# Patient Record
Sex: FEMALE | URBAN - METROPOLITAN AREA
[De-identification: names, ages, dates, MRNs, and addresses within clinical notes are randomized per-mention and may not be internally consistent; named-entity substitution may affect disease eponyms.]

---
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
## 2019-11-05 ENCOUNTER — HEALTH MAINTENANCE LETTER (OUTPATIENT)
Age: 19
End: 2019-11-05

## 2020-11-22 ENCOUNTER — HEALTH MAINTENANCE LETTER (OUTPATIENT)
Age: 20
End: 2020-11-22

## 2021-08-26 NOTE — PROGRESS NOTES
Psychiatry- [address] [address]   CLINICAL PROGRESS NOTE   NAME: Ruby Castillo  : 2000 JEFF: 2018  SESSION TYPE: Individual Psychotherapy ([address])  CLINICIAN: Vicki Alcaraz, Ph.D., L.P.  TIME/LENGTH OF SESSION: 1:00 p.m. - 1:35 p.m. (35 minutes)   DIAGNOSIS: Anxiety Disorder, not otherwise specified  PARTICIPANTS: Ruby Castillo, Rosaura Castillo (mother), Vicki Alcaraz, Ph.D., L.P.  Subjective: Rosaura had called several weeks ago requesting an appointment while Ruby was home for winter break from college. During the appointment, Ruby explained that she had run out of her medication (both SSRI and stimulant) for several weeks approximately two months ago. At that point, Ruby experienced depressive symptoms including irritability. Fortunately, she was able to access care in [place] and her symptoms improved dramatically once she restarted her medication. She now has a psychiatrist near [place], but she has not found a new therapist. She noted that she has made friends at school. She did relatively well academically and is looking forward to the upcoming semester. She continues dating her boyfriend long-distance. She noted that she has been less concerned about her mom being upset with her since she cannot see her mom's face when they talk. Rosaura reported that overall she has been pleased with Ruby's transition to college. Initially she had been upset when Ruby ran out of medicine because she had not followed through with establishing care with new providers as they had discussed. She expressed a desire to communicate slightly more frequently but noted that she yin with [place] moving away by pretending that Ruby may not be engaged in irresponsible behaviors.    Treatment: Ruby and her m om arrived on time for today's appointment. Today was Ruby's first appointment with this writer in 8 months  following a call from her mom. Ruby and I met individually first and discussed the details in the subjective section. Her mom joined for the second half of the visit.   Assessment: Ruby participated well in session. She was casually dressed and appropriately groomed. Affect was full range. No concerns with noted with psychomotor agitation or retardation. She toward the front edge of her seat for most of the visit. She interacted with her mom appropriately. It seems Ruby may be setting herself up for a challenging semester by taking a 4000 level course but she is excited to obtain a double major and double minor (she reports this is not unusual in [employer] universities). She and her mom had a slight disagreement regarding the class but conversed appropriately.    Plan: Ruby does not currently have a return appointment scheduled.  Ruby and her mom are aware she is welcome to contact this writer for future visits when she is home from college.     TREATMENT PLAN DUE FOR REVIEW BY NEXT VISIT     Treatment Area Prep: acetone

## 2021-09-19 ENCOUNTER — HEALTH MAINTENANCE LETTER (OUTPATIENT)
Age: 21
End: 2021-09-19

## 2022-01-09 ENCOUNTER — HEALTH MAINTENANCE LETTER (OUTPATIENT)
Age: 22
End: 2022-01-09

## 2022-11-20 ENCOUNTER — HEALTH MAINTENANCE LETTER (OUTPATIENT)
Age: 22
End: 2022-11-20

## 2023-04-15 ENCOUNTER — HEALTH MAINTENANCE LETTER (OUTPATIENT)
Age: 23
End: 2023-04-15